# Patient Record
Sex: MALE | Race: WHITE | Employment: FULL TIME | URBAN - METROPOLITAN AREA
[De-identification: names, ages, dates, MRNs, and addresses within clinical notes are randomized per-mention and may not be internally consistent; named-entity substitution may affect disease eponyms.]

---

## 2019-10-09 ENCOUNTER — OFFICE VISIT (OUTPATIENT)
Dept: HEMATOLOGY | Age: 49
End: 2019-10-09

## 2019-10-09 ENCOUNTER — HOSPITAL ENCOUNTER (OUTPATIENT)
Dept: LAB | Age: 49
Discharge: HOME | End: 2019-10-09
Payer: COMMERCIAL

## 2019-10-09 VITALS
SYSTOLIC BLOOD PRESSURE: 152 MMHG | TEMPERATURE: 98.1 F | OXYGEN SATURATION: 96 % | BODY MASS INDEX: 32.93 KG/M2 | HEIGHT: 70 IN | WEIGHT: 230 LBS | HEART RATE: 70 BPM | DIASTOLIC BLOOD PRESSURE: 107 MMHG

## 2019-10-09 DIAGNOSIS — B18.2 CHRONIC HEPATITIS C WITHOUT HEPATIC COMA (HCC): Primary | ICD-10-CM

## 2019-10-09 DIAGNOSIS — B18.2 CHRONIC HEPATITIS C WITHOUT HEPATIC COMA (HCC): ICD-10-CM

## 2019-10-09 LAB
ALBUMIN SERPL-MCNC: 3.6 G/DL (ref 3.4–5)
ALBUMIN/GLOB SERPL: 0.8 {RATIO} (ref 0.8–1.7)
ALP SERPL-CCNC: 277 U/L (ref 45–117)
ALT SERPL-CCNC: 175 U/L (ref 16–61)
ANION GAP SERPL CALC-SCNC: 5 MMOL/L (ref 3–18)
AST SERPL-CCNC: 184 U/L (ref 10–38)
BASOPHILS # BLD: 0 K/UL (ref 0–0.1)
BASOPHILS NFR BLD: 0 % (ref 0–2)
BILIRUB DIRECT SERPL-MCNC: 0.3 MG/DL (ref 0–0.2)
BILIRUB SERPL-MCNC: 0.6 MG/DL (ref 0.2–1)
BUN SERPL-MCNC: 12 MG/DL (ref 7–18)
BUN/CREAT SERPL: 15 (ref 12–20)
CALCIUM SERPL-MCNC: 8.7 MG/DL (ref 8.5–10.1)
CHLORIDE SERPL-SCNC: 104 MMOL/L (ref 100–111)
CO2 SERPL-SCNC: 29 MMOL/L (ref 21–32)
CREAT SERPL-MCNC: 0.81 MG/DL (ref 0.6–1.3)
DIFFERENTIAL METHOD BLD: ABNORMAL
EOSINOPHIL # BLD: 0.1 K/UL (ref 0–0.4)
EOSINOPHIL NFR BLD: 2 % (ref 0–5)
ERYTHROCYTE [DISTWIDTH] IN BLOOD BY AUTOMATED COUNT: 12.6 % (ref 11.6–14.5)
FERRITIN SERPL-MCNC: 205 NG/ML (ref 8–388)
GLOBULIN SER CALC-MCNC: 4.3 G/DL (ref 2–4)
GLUCOSE SERPL-MCNC: 97 MG/DL (ref 74–99)
HCT VFR BLD AUTO: 41 % (ref 36–48)
HGB BLD-MCNC: 13.8 G/DL (ref 13–16)
IRON SATN MFR SERPL: 60 %
IRON SERPL-MCNC: 224 UG/DL (ref 50–175)
LYMPHOCYTES # BLD: 1.7 K/UL (ref 0.9–3.6)
LYMPHOCYTES NFR BLD: 31 % (ref 21–52)
MCH RBC QN AUTO: 34.8 PG (ref 24–34)
MCHC RBC AUTO-ENTMCNC: 33.7 G/DL (ref 31–37)
MCV RBC AUTO: 103.5 FL (ref 74–97)
MONOCYTES # BLD: 0.5 K/UL (ref 0.05–1.2)
MONOCYTES NFR BLD: 9 % (ref 3–10)
NEUTS SEG # BLD: 3.2 K/UL (ref 1.8–8)
NEUTS SEG NFR BLD: 58 % (ref 40–73)
PLATELET # BLD AUTO: 79 K/UL (ref 135–420)
PMV BLD AUTO: 10.9 FL (ref 9.2–11.8)
POTASSIUM SERPL-SCNC: 3.9 MMOL/L (ref 3.5–5.5)
PROT SERPL-MCNC: 7.9 G/DL (ref 6.4–8.2)
RBC # BLD AUTO: 3.96 M/UL (ref 4.7–5.5)
SODIUM SERPL-SCNC: 138 MMOL/L (ref 136–145)
TIBC SERPL-MCNC: 371 UG/DL (ref 250–450)
WBC # BLD AUTO: 5.5 K/UL (ref 4.6–13.2)

## 2019-10-09 PROCEDURE — 85025 COMPLETE CBC W/AUTO DIFF WBC: CPT

## 2019-10-09 PROCEDURE — 86708 HEPATITIS A ANTIBODY: CPT

## 2019-10-09 PROCEDURE — 82728 ASSAY OF FERRITIN: CPT

## 2019-10-09 PROCEDURE — 87340 HEPATITIS B SURFACE AG IA: CPT

## 2019-10-09 PROCEDURE — 87521 HEPATITIS C PROBE&RVRS TRNSC: CPT

## 2019-10-09 PROCEDURE — 80048 BASIC METABOLIC PNL TOTAL CA: CPT

## 2019-10-09 PROCEDURE — 80076 HEPATIC FUNCTION PANEL: CPT

## 2019-10-09 PROCEDURE — 83540 ASSAY OF IRON: CPT

## 2019-10-09 PROCEDURE — 86704 HEP B CORE ANTIBODY TOTAL: CPT

## 2019-10-09 PROCEDURE — 86706 HEP B SURFACE ANTIBODY: CPT

## 2019-10-09 PROCEDURE — 36415 COLL VENOUS BLD VENIPUNCTURE: CPT

## 2019-10-09 RX ORDER — BUPRENORPHINE HYDROCHLORIDE AND NALOXONE HYDROCHLORIDE DIHYDRATE 8; 2 MG/1; MG/1
TABLET SUBLINGUAL DAILY
COMMUNITY

## 2019-10-09 NOTE — PROGRESS NOTES
[facility], MD, Camilla Angelucci, Stafford Copas, MD Bula Lin, PAEFRAÍN Juarez, [facility]     Luz Marina Mckay, [facility]   [place] Ceasar, WILLIAM    Jennifer Bridgette, [facility]       Manjit Finney [address]    at [address], [facility] Stefania Corona  22.    [phone]    FAX: [address] [place] [address], [address]    [phone]    FAX: [phone]       Patient Care Team:  Ellyn Parr MD as PCP - General (Family Practice)      Problem List  Date Reviewed: 2/18/2016          Codes Class Noted    Chronic hepatitis C ([facility] [address] ICD-10-CM: B18.2  ICD-9-CM: 070.54  8/17/2015        Drug addiction in remission [facility]) ICD-10-CM: M78.68  ICD-9-CM: 304.93  8/17/2015    Overview Signed 8/17/2015 11:01 AM by Satya Coates MD     Taking subutex. Last drug use 2009. Depression ICD-10-CM: F32.9  ICD-9-CM: 375  8/17/2015              The clinicians listed above have asked me to see Vanessa Morales in consultation regarding chronic HCV and its management. All medical records sent by the referring physicians were reviewed including imaging studies     The patient is a 51 y.o.  male who was found to have abnormalities in liver chemistries and subsequently tested positive for chronic HCV in 6/2015. His last visit to this office was 2/2016. Risk factors for acquiring HCV are IV drug use in [id number]. There was no history of acute incteric hepatitis at the time of these risk factors. Imaging of the liver was either not performed or the results are not available to me. An assessment of liver fibrosis with biopsy or elastography has not been performed.       The patient has never received treatment for chronic HCV. The patient has no symptoms which can be attributed to the liver disorder. The patient has not experienced the following symptoms:  fatigue, pain in the right side over the liver, problems concentrating. The patient completes all daily activities without any functional limitations. ASSESSMENT AND PLAN:  Chronic HCV   Chronic HCV of unclear severity. Will perform laboratory testing to monitor liver function and degree of liver injury. This included BMP, hepatic panel, CBC with platelet count. Will perform and/or review results of HCV viral load, HCV genotype to define the specific treatment and duration of treatment that will be required. Will perform serologic and virologic studies to assess for other causes of chronic liver disease. Will perform imaging of the liver with ultrasound. The degree of fibrosis will be assessed by liver biopsy, Fibroscan or shear wave elastography. Chronic HCV Treatment  The patient has not been treated for HCV. The patient has HCV genotype 1a. Discussed the treatment alternatives. The SVR/cure rate for HCV now exceeds 97% without significant side effects for most patients with HCV. The specific treatment is dependent upon genotype, viral load and histology. The patient should be treated with Mavyret (glecaprevir and piprentasvir) or Epclusa (sofosbuvir and valpitasvir). The SVR/cure rate for is over 95%. Screening for Hepatocellular Carcinoma  HCC screening is not necessary if the patient has no evidence of cirrhosis. Treatment of other medical problems in patients with chronic liver disease  There are no contraindications for the patient to take most medications that are necessary for treatment of other medical issues.     The patient can take   Any medications utilized for treatment of DM  Statins to treat hypercholesterolemia    The patient consumes alcohol on a daily basis or has recently stopped consuming alcohol. Regular alcohol use increases the risk of toxicity from acetaminophen. This analgesic should be avoided until the patient has been abstinent from alcohol for 6 months. Counseling for alcohol in patients with chronic liver disease  The patient was counseled regarding alcohol consumption and the effect of alcohol on chronic liver disease. It was recommended that all alcohol consumption be stopped and the patient be abstinent from alcohol    Vaccinations   The need for vaccination against viral hepatitis A and B will be assessed with serologic and instituted as appropriate. Routine vaccinations against other bacterial and viral agents can be performed as indicated. Annual flu vaccination should be administered if indicated. ALLERGIES  Allergies   Allergen Reactions    Bees [Hymenoptera Allergenic Extract] Anaphylaxis       MEDICATIONS  Current Outpatient Medications   Medication Sig    buprenorphine-naloxone 8-2 mg subl by SubLINGual route daily. No current facility-administered medications for this visit. SYSTEM REVIEW NOT RELATED TO LIVER DISEASE OR REVIEWED ABOVE:  Constitution systems: Negative for fever, chills, weight gain, weight loss. Eyes: Negative for visual changes. ENT: Negative for sore throat, painful swallowing. Respiratory: Negative for cough, hemoptysis, SOB. Cardiology: Negative for chest pain, palpitations. GI:  Negative for constipation or diarrhea. : Negative for urinary frequency, dysuria, hematuria, nocturia. Skin: Negative for rash. Hematology: Negative for easy bruising, blood clots. Musculo-skelatal: Negative for back pain, muscle pain, weakness. Neurologic: Negative for headaches, dizziness, vertigo, memory problems not related to HE. Psychology: Negative for anxiety, depression. FAMILY HISTORY:  The father  of MI. The mother  of complications of flu. There is no family history of liver disease.       SOCIAL HISTORY:  The patient has never been . The patient has no children. The patient currently smokes 1 pack of tobacco daily. The patient consumes 12 alcoholic beverages per week. The patient currently works full time . PHYSICAL EXAMINATION:  Visit Vitals  BP (!) 140/100 (BP 1 Location: Left arm, BP Patient Position: Sitting)   Pulse 70   Temp 98.1 °F (36.7 °C)   SpO2 96%       General: No acute distress. Eyes: Sclera anicteric. ENT: No oral lesions. Thyroid normal.  Nodes: No adenopathy. Skin: No spider angiomata. No jaundice. No palmar erythema. Respiratory: Lungs clear to auscultation. Cardiovascular: Regular heart rate. No murmurs. No JVD. Abdomen: Soft non-tender. Liver size normal to percussion/palpation. Spleen not palpable. No obvious ascites. Extremities: No edema. No muscle wasting. No gross arthritic changes. Neurologic: Alert and oriented. Cranial nerves grossly intact. No asterixis. LABORATORY STUDIES:  Recent liver function panel, CBC with platelet count and BMP are not available. These studies will be performed. SEROLOGIES:  Not available or performed. Testing was performed today. LIVER HISTOLOGY:  02/2016. [facility]. EkPa was 11.6. Suggested fibrosis level is F3. ENDOSCOPIC PROCEDURES:  Not available or performed    RADIOLOGY:  Not available or performed    OTHER TESTING:  Not available or performed    FOLLOW-UP:  All of the issues listed above in the Assessment and Plan were discussed with the patient. All questions were answered. The patient expressed a clear understanding of the above. [facility] 87 in 4 weeks for elastography and to initiate HCV treatment.       JENNY Atkinson  [address] Liver [place] [address], [facility] [address]  [place], [address] [place] [address] Street - [address]  [phone]

## 2019-10-10 LAB
HAV AB SER QL IA: NEGATIVE
HBV CORE AB SERPL QL IA: NEGATIVE
HBV SURFACE AB SER QL IA: NEGATIVE
HBV SURFACE AB SERPL IA-ACNC: <3.1 MIU/ML
HBV SURFACE AG SER QL: <0.1 INDEX
HBV SURFACE AG SER QL: NEGATIVE
HEP BS AB COMMENT,HBSAC: ABNORMAL

## 2019-10-11 LAB
HCV RNA SERPL NAA+PROBE-LOG IU: 6.81 HCV LOG 10IU/ML
HCV RNA SERPL PROBE AMP-ACNC: ABNORMAL HCVIU/ML
HCV RNA SERPL QL NAA+PROBE: POSITIVE

## 2019-10-14 VITALS
HEART RATE: 70 BPM | DIASTOLIC BLOOD PRESSURE: 100 MMHG | OXYGEN SATURATION: 96 % | TEMPERATURE: 98.1 F | SYSTOLIC BLOOD PRESSURE: 140 MMHG

## 2019-11-05 NOTE — PROGRESS NOTES
[facility], Amy CROFT Karilyn Henderson, MD Jeffie Ribas, PAEFRAÍN Jane, [facility]     Luz Marina OLIVARES Nely, [facility]   Payammatthew Moulton, P-KISHAN Betancourt, [facility]       Manjit Finney [facility] 136    at [address], [address] Stefania Corona  22.    [phone]    FAX: [address] [place] [address], [address]    [phone]    FAX: [phone]         Patient Care Team:  Parker Ballesteros MD as PCP - General (Family Practice)      Problem List  Date Reviewed: 2/18/2016          Codes Class Noted    Chronic hepatitis C ([facility] ICD-10-CM: B18.2  ICD-9-CM: 070.54  8/17/2015        Drug addiction in remission [facility]) ICD-10-CM: K98.85  ICD-9-CM: 304.93  8/17/2015    Overview Signed 8/17/2015 11:01 AM by Andrea Lo MD     Taking subutex. Last drug use 2009. Depression ICD-10-CM: F32.9  ICD-9-CM: 291  8/17/2015              Mookie Castro returns to the [place] [address] for management of chronic HBV. The active problem list, all pertinent past medical history, medications, radiologic findings and laboratory findings related to the liver disorder were reviewed with the patient. The patient is a 51 y.o.  male who was noted to have abnormalities in liver chemistries and subsequently tested positive for chronic HCV in 6/2015. This is the first appointment at [place] [address] since 2/2016. A fibroscan performed in 2/2016 suggests stage 3, bridging fibrosis. The patient has never received treatment for chronic HCV.       The patient has the following symptoms which are thought to be due to the liver disease: fatigue,     The patient has not experienced the following symptoms: pain in the right side over the liver,     The patient completes all daily activities without any functional limitations. 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 Severity of the liver disease was assessed by elastography in 2016  Will repeat elastography prior to strting HCV treatment. Have performed laboratory testing to monitor liver function and degree of liver injury. This included BMP, hepatic panel, CBC with platelet count, INR. Liver transaminases are elevated. ALP is elevated. Liver function is normal.  The platelet count is   Depressed. Based upon laboratory studies Fibroscan, and imaging the patient may have cirrhosis. Will perform and/or review results of HCV viral load, HCV genotype to define the specific treatment and duration of treatment that will be required. Chronic HCV Treatment  The patient has not been treated for HCV. The patient has HCV genotype 1A. Discussed the treatment alternatives. The SVR/cure rate for HCV now exceeds 97% without significant side effects for most patients with HCV. The specific treatment is dependent upon genotype, viral load and histology. The patient should be treated with Harvoni (sofasbuvir and ledipasvir), Mavyret (glecaprevir and piprentasvir) or Epclusa (sofosbuvir and valpitasvir) The SVR/cure rate for is over 95%. Thrombocytopenia   This is secondary to cirrhosis. There is no evidence of overt bleeding. No treatment is required. The platelet count is adequate for the patient to undergo procedures without the need for platelet transfusion or platelet growth factors. Screening for Hepatocellular Carcinoma  [address] screening has not been not been performed. AFP was ordered today and ultrasound will be scheduled.     Treatment of other medical problems in patients with chronic liver disease  There are no contraindications for the patient to take most medications that are necessary for treatment of other medical issues. The patient has cirrhrosis and should avoid taking NSAIDs which are associated with a higher rate of developing TIFFANIE. The patient can take Any medications utilized for treatment of DM, Statins to treat hypercholesterolemia    The patient does not comsume alcohol on a daily basis. Normal doses of acetaminophen, as recommended on the label of the bottle, are not hepatotoxic except in the setting of daily alcohol use, even in patients with cirrhosis and can be utilized for pain. Counseling for alcohol in patients with chronic liver disease  The patient was counseled regarding alcohol consumption and the effect of alcohol on chronic liver disease. The patient does not consume any significant amount of alcohol. Vaccinations   Vaccination for viral hepatitis A and B is recommended since the patient has no serologic evidence of previous exposure or vaccination with immunity. Routine vaccinations against other bacterial and viral agents can be performed as indicated. Annual flu vaccination should be administered if indicated. ALLERGIES  Allergies   Allergen Reactions    Bees [Hymenoptera Allergenic Extract] Anaphylaxis       MEDICATIONS  Current Outpatient Medications   Medication Sig    buprenorphine-naloxone 8-2 mg subl by SubLINGual route daily. No current facility-administered medications for this visit. SYSTEM REVIEW NOT RELATED TO LIVER DISEASE OR REVIEWED ABOVE:  Constitution systems: Negative for fever, chills, weight gain, weight loss. Eyes: Negative for visual changes. ENT: Negative for sore throat, painful swallowing. Respiratory: Negative for cough, hemoptysis, SOB. Cardiology: Negative for chest pain, palpitations. GI:  Negative for constipation or diarrhea. : Negative for urinary frequency, dysuria, hematuria, nocturia. Skin: Negative for rash. Hematology: Negative for easy bruising, blood clots. Musculo-skelatal: Negative for back pain, muscle pain, weakness. Neurologic: Negative for headaches, dizziness, vertigo, memory problems not related to HE. Psychology: Negative for anxiety, depression. FAMILY HISTORY:  The father  of COPD. The mother  of CHF. There is no family history of liver disease. SOCIAL HISTORY:  The patient has never been . The patient has no children. The patient currently smokes 1 pack of tobacco daily. The patient consumes 4-5 alcoholic beverages per day. The patient currently works full time as an . PHYSICAL EXAMINATION:  Visit Vitals  BP (!) 152/107   Pulse 70   Temp 98.1 °F (36.7 °C) (Tympanic)   Ht 5' 10\" (1.778 m)   Wt 230 lb (104.3 kg)   SpO2 96%   BMI 33.00 kg/m²     General: No acute distress. Eyes: Sclera anicteric. ENT: No oral lesions. Thyroid normal.  Nodes: No adenopathy. Skin: No spider angiomata. No jaundice. No palmar erythema. Respiratory: Lungs clear to auscultation. Cardiovascular: Regular heart rate. No murmurs. No JVD. Abdomen: Soft non-tender. Liver size normal to percussion/palpation. Spleen not palpable. No obvious ascites. Extremities: No edema. No muscle wasting. No gross arthritic changes. Neurologic: Alert and oriented. Cranial nerves grossly intact. No asterixis.     LABORATORY STUDIES:  [facility] Ref Rng 2016   WBC 3.4 - 10.8 x10E3/uL  4.9   ANC 1.4 - 7.0 x10E3/uL  3.0   HGB 12.6 - 17.7 g/dL  13.9    - 379 x10E3/uL  89 (LL)   AST 0 - 40 IU/L 56 (H) 78 (H)   ALT 0 - 44 IU/L 54 (H) 81 (H)   Alk Phos 39 - 117 IU/L 100 110   Bili, Total 0.0 - 1.2 mg/dL 0.7 0.9   Bili, Direct 0.00 - 0.40 mg/dL  0.23   Albumin 3.5 - 5.5 g/dL 4.5 4.2   BUN 6 - 24 mg/dL 11 11   Creat 0.76 - 1.27 mg/dL 1.01 0.95   Na 134 - 144 mmol/L 141 138   K 3.5 - 5.2 mmol/L 4.7 4.6   Cl 97 - 108 mmol/L 100 98   CO2 18 - 29 mmol/L 28 27   Glucose 65 - 99 mg/dL 93 99 SEROLOGIES:  Serologies Latest Ref Rng 8/17/2015   Hep A Ab, Total Negative Negative   Hep B Surface Ag Negative Negative   Hep B Core Ab, Total Negative Negative   Hep B Surface AB QL  Non Reactive   Hep C Genotype  1a   HCV RT-PCR, Quant  See Final Results   Ferritin 30 - 400 ng/mL 255   Iron % Saturation 15 - 55 % 50     Serologies Latest Ref Rng 2/11/2016   Hep A Ab, Total Negative    Hep B Surface Ag Negative    Hep B Core Ab, Total Negative    Hep B Surface AB QL     Hep C Genotype     HCV RT-PCR, Quant  [id number]   Ferritin 30 - 400 ng/mL    Iron % Saturation 15 - 55 %      LIVER HISTOLOGY:  02/2016. FibroScan performed at [address]. EkPa was 11.6. Suggested fibrosis level is F3. ENDOSCOPIC PROCEDURES:  Not available or performed    RADIOLOGY:  Not available or performed    OTHER TESTING:  Not available or performed    FOLLOW-UP:  All of the issues listed above in the Assessment and Plan were discussed with the patient. All questions were answered. The patient expressed a clear understanding of the above. [facility] 87 in 4 weeks and to initiate HCV treatment.       Thaddeus Zamudio MD  [address]  4 [facility], [address] [place] [address]  98 [address], [address]  12 [facility]

## 2019-11-06 ENCOUNTER — OFFICE VISIT (OUTPATIENT)
Dept: HEMATOLOGY | Age: 49
End: 2019-11-06

## 2019-11-06 ENCOUNTER — HOSPITAL ENCOUNTER (OUTPATIENT)
Dept: ULTRASOUND IMAGING | Age: 49
Discharge: HOME | End: 2019-11-06
Attending: NURSE PRACTITIONER
Payer: COMMERCIAL

## 2019-11-06 VITALS
HEIGHT: 70 IN | WEIGHT: 232 LBS | HEART RATE: 82 BPM | TEMPERATURE: 98.2 F | BODY MASS INDEX: 33.21 KG/M2 | OXYGEN SATURATION: 97 % | DIASTOLIC BLOOD PRESSURE: 102 MMHG | SYSTOLIC BLOOD PRESSURE: 150 MMHG

## 2019-11-06 DIAGNOSIS — B18.2 CHRONIC HEPATITIS C WITHOUT HEPATIC COMA (HCC): ICD-10-CM

## 2019-11-06 DIAGNOSIS — B18.2 CHRONIC HEPATITIS C WITHOUT HEPATIC COMA (HCC): Primary | ICD-10-CM

## 2019-11-06 DIAGNOSIS — K74.60 CIRRHOSIS OF LIVER WITHOUT ASCITES, UNSPECIFIED HEPATIC CIRRHOSIS TYPE (HCC): ICD-10-CM

## 2019-11-06 PROCEDURE — 76981 USE PARENCHYMA: CPT

## 2019-11-06 RX ORDER — LISINOPRIL 10 MG/1
10 TABLET ORAL DAILY
Qty: 30 TAB | Refills: 3 | Status: SHIPPED | OUTPATIENT
Start: 2019-11-06

## 2019-11-06 RX ORDER — VELPATASVIR AND SOFOSBUVIR 100; 400 MG/1; MG/1
1 TABLET, FILM COATED ORAL DAILY
Qty: 28 TAB | Refills: 2 | Status: SHIPPED | OUTPATIENT
Start: 2019-11-06 | End: 2019-11-18 | Stop reason: ALTCHOICE

## 2019-11-06 NOTE — PROGRESS NOTES
[address] [facility], MD, Meenakshi Scott MD Joneen Cheers, MIKEL Gannon, [facility]     Luz Marina OLIVARES Nely, [facility]   JAMES Lockhart-KISHAN Hussein, [facility]       Manjit Finney [facility] [address]    at [address], [address] Stefania Corona  22.    [phone]    FAX: [address] [place] [address], [address]    [phone]    FAX: [phone]       Patient Care Team:  Ac Mercado MD as PCP - General (Family Practice)      Problem List  Date Reviewed: 11/8/2019          Codes Class Noted    Cirrhosis of liver without ascites [facility]) ICD-10-CM: K74.60  ICD-9-CM: 571.5  11/23/2019        Chronic hepatitis C ([facility] 75.) ICD-10-CM: B18.2  ICD-9-CM: 070.54  8/17/2015        Drug addiction in remission [facility]) ICD-10-CM: O81.75  ICD-9-CM: 304.93  8/17/2015    Overview Signed 8/17/2015 11:01 AM by Eliza Morales MD     Taking subutex. Last drug use 2009. Depression ICD-10-CM: F32.9  ICD-9-CM: 696  8/17/2015              Jeremy Robison returns to the [place] [address] for management of chronic HCV. The active problem list, all pertinent past medical history, medications, liver histology, radiologic findings, and laboratory findings related to the liver disorder were reviewed with the patient. The patient is a 51 y.o.  male who was found to have abnormalities in liver chemistries and subsequently tested positive for chronic HCV in 6/2015. His last visit to this office was 2/2016. Risk factors for acquiring HCV are IV drug use in 0366-4891. There was no history of acute incteric hepatitis at the time of these risk factors.         The most recent imaging of the liver was Ultrasound performed in 11/2019. Results suggest chronic liver disease. Assessment of liver fibrosis was performed with shear wave elastography at THE [facility] in 11/2019. The result was E mean 12.15 kPa which correlates with cirrhosis. The patient has never received treatment for chronic HCV. The patient has no symptoms which can be attributed to the liver disorder. The patient has not experienced the following symptoms:  fatigue, pain in the right side over the liver, problems concentrating. The patient completes all daily activities without any functional limitations. ASSESSMENT AND PLAN:  Chronic HCV   Chronic HCV with cirrhosis. Have performed laboratory testing to monitor liver function and degree of liver injury. This included BMP, hepatic panel, CBC with platelet count. Reviewed results of HCV viral load, HCV genotype to define the specific treatment and duration of treatment that will be required. Serologic and virologic studies to assess for other causes of chronic liver disease were negative. Chronic HCV Treatment  The patient has not been treated for HCV. The patient has HCV genotype 1a. Discussed the treatment alternatives. The SVR/cure rate for HCV now exceeds 97% without significant side effects for most patients with HCV. The specific treatment is dependent upon genotype, viral load and histology. The patient should be treated with Mavyret (glecaprevir and piprentasvir) or Epclusa (sofosbuvir and valpitasvir). The SVR/cure rate for is over 95%. Screening for Hepatocellular Carcinoma  HCC screening is not necessary if the patient has no evidence of cirrhosis. Treatment of other medical problems in patients with chronic liver disease  There are no contraindications for the patient to take most medications that are necessary for treatment of other medical issues.     The patient can take   Any medications utilized for treatment of DM  Statins to treat hypercholesterolemia  The patient consumes alcohol on a daily basis or has recently stopped consuming alcohol. Regular alcohol use increases the risk of toxicity from acetaminophen. This analgesic should be avoided until the patient has been abstinent from alcohol for 6 months. Counseling for alcohol in patients with chronic liver disease  The patient was counseled regarding alcohol consumption and the effect of alcohol on chronic liver disease. It was recommended that all alcohol consumption be stopped and the patient be abstinent from alcohol    Vaccinations   Vaccination for viral hepatitis A and B is not needed. The patient has serologic evidence of prior exposure or vaccination with immunity. Routine vaccinations against other bacterial and viral agents can be performed as indicated. Annual flu vaccination should be administered if indicated. ALLERGIES  Allergies   Allergen Reactions    Bees [Hymenoptera Allergenic Extract] Anaphylaxis       MEDICATIONS  Current Outpatient Medications   Medication Sig    lisinopril (PRINIVIL, ZESTRIL) 10 mg tablet Take 1 Tab by mouth daily.  buprenorphine-naloxone 8-2 mg subl by SubLINGual route daily.  glecaprevir-pibrentasvir (MAVYRET) 100-40 mg tab Take 3 Tabs by mouth daily. No current facility-administered medications for this visit. SYSTEM REVIEW NOT RELATED TO LIVER DISEASE OR REVIEWED ABOVE:  Constitution systems: Negative for fever, chills, weight gain, weight loss. Eyes: Negative for visual changes. ENT: Negative for sore throat, painful swallowing. Respiratory: Negative for cough, hemoptysis, SOB. Cardiology: Negative for chest pain, palpitations. GI:  Negative for constipation or diarrhea. : Negative for urinary frequency, dysuria, hematuria, nocturia. Skin: Negative for rash. Hematology: Negative for easy bruising, blood clots. Musculo-skelatal: Negative for back pain, muscle pain, weakness.   Neurologic: Negative for headaches, dizziness, vertigo, memory problems not related to HE. Psychology: Negative for anxiety, depression. FAMILY HISTORY:  The father  of MI. The mother  of complications of flu. There is no family history of liver disease. SOCIAL HISTORY:  The patient has never been . The patient has no children. The patient currently smokes 1 pack of tobacco daily. The patient consumes 12 alcoholic beverages per week. The patient currently works full time . PHYSICAL EXAMINATION:  Visit Vitals  BP (!) 150/102   Pulse 82   Temp 98.2 °F (36.8 °C)   Ht 5' 10\" (1.778 m)   Wt 232 lb (105.2 kg)   SpO2 97%   BMI 33.29 kg/m²       General: No acute distress. Eyes: Sclera anicteric. ENT: No oral lesions. Thyroid normal.  Nodes: No adenopathy. Skin: No spider angiomata. No jaundice. No palmar erythema. Respiratory: Lungs clear to auscultation. Cardiovascular: Regular heart rate. No murmurs. No JVD. Abdomen: Soft non-tender. Liver size normal to percussion/palpation. Spleen not palpable. No obvious ascites. Extremities: No edema. No muscle wasting. No gross arthritic changes. Neurologic: Alert and oriented. Cranial nerves grossly intact. No asterixis.     LABORATORY STUDIES:  Liver [place] of [address] Units 10/9/2019   WBC 4.6 - 13.2 K/uL 5.5   ANC 1.8 - 8.0 K/UL 3.2   HGB 13.0 - 16.0 g/dL 13.8    - 420 K/uL 79 (L)   AST 10 - 38 U/L 184 (H)   ALT 16 - 61 U/L 175 (H)   Alk Phos 45 - 117 U/L 277 (H)   Bili, Total 0.2 - 1.0 MG/DL 0.6   Bili, Direct 0.0 - 0.2 MG/DL 0.3 (H)   Albumin 3.4 - 5.0 g/dL 3.6   BUN 7.0 - 18 MG/DL 12   Creat 0.6 - 1.3 MG/DL 0.81   Na 136 - 145 mmol/L 138   K 3.5 - 5.5 mmol/L 3.9   Cl 100 - 111 mmol/L 104   CO2 21 - 32 mmol/L 29   Glucose 74 - 99 mg/dL 97       SEROLOGIES:  Serologies Latest Ref Rng & Units 10/9/2019   Hep A Ab, Total NEGATIVE   NEGATIVE   Hep B Surface Ag <1.00 Index <0.10   Hep B Surface Ag Interp NEG   NEGATIVE   Hep B Core Ab, Total NEGATIVE   NEGATIVE   Hep B Surface Ab >10.0 mIU/mL <3.10 (L)   Hep B Surface Ab Interp POS   NEGATIVE (A)   HCV RT-PCR, Quant IU/mL 5,878,290    Ferritin 8 - 388 NG/   Iron % Saturation % 60       LIVER HISTOLOGY:  02/2016. [facility]. EkPa was 11.6. Suggested fibrosis level is F3.  11/2019. Shear wave elastography performed at THE [facility]. EkPa was E Range: 9.52-13.48 kPa, E Mean: 12.15 kPa, E Median: 12.13 kPa, E Std: 1.64 kPa. The results suggested a fibrosis level of F4. ENDOSCOPIC PROCEDURES:  Not available or performed    RADIOLOGY:  11/2019. Ultrasound of liver. Echogenic consistent with chronic liver disease. No liver mass lesions. No dilated bile ducts. No ascites. OTHER TESTING:  Not available or performed    FOLLOW-UP:  All of the issues listed above in the Assessment and Plan were discussed with the patient. All questions were answered. The patient expressed a clear understanding of the above. [address] [place] [address] in 4 weeks for elastography and to initiate HCV treatment.       Brandie Martinez, AGPCNP-BC  [address] [facility] [place] [address], [address] [place] [address] - [address]  [phone]

## 2019-11-06 NOTE — PROGRESS NOTES
Vanessa Morales is a 51 y.o. male      1. Have you been to the ER, urgent care clinic or hospitalized since your last visit? NO.     2. Have you seen or consulted any other health care providers outside of the [address] [place] [address] since your last visit (Include any pap smears or colon screening)? NO          No flowsheet data found.

## 2019-11-23 PROBLEM — K74.60 CIRRHOSIS OF LIVER WITHOUT ASCITES (HCC): Status: ACTIVE | Noted: 2019-11-23

## 2020-02-03 ENCOUNTER — OFFICE VISIT (OUTPATIENT)
Dept: HEMATOLOGY | Age: 50
End: 2020-02-03

## 2020-02-03 ENCOUNTER — HOSPITAL ENCOUNTER (OUTPATIENT)
Dept: LAB | Age: 50
Discharge: HOME | End: 2020-02-03
Payer: COMMERCIAL

## 2020-02-03 VITALS
DIASTOLIC BLOOD PRESSURE: 79 MMHG | HEART RATE: 70 BPM | BODY MASS INDEX: 33.07 KG/M2 | HEIGHT: 70 IN | OXYGEN SATURATION: 97 % | SYSTOLIC BLOOD PRESSURE: 125 MMHG | WEIGHT: 231 LBS | TEMPERATURE: 97.3 F

## 2020-02-03 DIAGNOSIS — B18.2 CHRONIC HEPATITIS C WITHOUT HEPATIC COMA (HCC): Primary | ICD-10-CM

## 2020-02-03 DIAGNOSIS — B18.2 CHRONIC HEPATITIS C WITHOUT HEPATIC COMA (HCC): ICD-10-CM

## 2020-02-03 LAB
ALBUMIN SERPL-MCNC: 3.6 G/DL (ref 3.4–5)
ALBUMIN/GLOB SERPL: 0.9 {RATIO} (ref 0.8–1.7)
ALP SERPL-CCNC: 206 U/L (ref 45–117)
ALT SERPL-CCNC: 42 U/L (ref 16–61)
ANION GAP SERPL CALC-SCNC: 1 MMOL/L (ref 3–18)
AST SERPL-CCNC: 41 U/L (ref 10–38)
BASOPHILS # BLD: 0 K/UL (ref 0–0.1)
BASOPHILS NFR BLD: 0 % (ref 0–2)
BILIRUB DIRECT SERPL-MCNC: 0.2 MG/DL (ref 0–0.2)
BILIRUB SERPL-MCNC: 0.5 MG/DL (ref 0.2–1)
BUN SERPL-MCNC: 12 MG/DL (ref 7–18)
BUN/CREAT SERPL: 13 (ref 12–20)
CALCIUM SERPL-MCNC: 8.8 MG/DL (ref 8.5–10.1)
CHLORIDE SERPL-SCNC: 105 MMOL/L (ref 100–111)
CO2 SERPL-SCNC: 34 MMOL/L (ref 21–32)
CREAT SERPL-MCNC: 0.9 MG/DL (ref 0.6–1.3)
DIFFERENTIAL METHOD BLD: ABNORMAL
EOSINOPHIL # BLD: 0.3 K/UL (ref 0–0.4)
EOSINOPHIL NFR BLD: 5 % (ref 0–5)
ERYTHROCYTE [DISTWIDTH] IN BLOOD BY AUTOMATED COUNT: 11.9 % (ref 11.6–14.5)
GLOBULIN SER CALC-MCNC: 4 G/DL (ref 2–4)
GLUCOSE SERPL-MCNC: 78 MG/DL (ref 74–99)
HCT VFR BLD AUTO: 39.5 % (ref 36–48)
HGB BLD-MCNC: 13.3 G/DL (ref 13–16)
LYMPHOCYTES # BLD: 2.8 K/UL (ref 0.9–3.6)
LYMPHOCYTES NFR BLD: 43 % (ref 21–52)
MCH RBC QN AUTO: 34.5 PG (ref 24–34)
MCHC RBC AUTO-ENTMCNC: 33.7 G/DL (ref 31–37)
MCV RBC AUTO: 102.6 FL (ref 74–97)
MONOCYTES # BLD: 0.7 K/UL (ref 0.05–1.2)
MONOCYTES NFR BLD: 10 % (ref 3–10)
NEUTS SEG # BLD: 2.9 K/UL (ref 1.8–8)
NEUTS SEG NFR BLD: 42 % (ref 40–73)
PLATELET # BLD AUTO: 88 K/UL (ref 135–420)
PMV BLD AUTO: 11.7 FL (ref 9.2–11.8)
POTASSIUM SERPL-SCNC: 4.5 MMOL/L (ref 3.5–5.5)
PROT SERPL-MCNC: 7.6 G/DL (ref 6.4–8.2)
RBC # BLD AUTO: 3.85 M/UL (ref 4.7–5.5)
SODIUM SERPL-SCNC: 140 MMOL/L (ref 136–145)
WBC # BLD AUTO: 6.7 K/UL (ref 4.6–13.2)

## 2020-02-03 PROCEDURE — 87521 HEPATITIS C PROBE&RVRS TRNSC: CPT

## 2020-02-03 PROCEDURE — 36415 COLL VENOUS BLD VENIPUNCTURE: CPT

## 2020-02-03 PROCEDURE — 80048 BASIC METABOLIC PNL TOTAL CA: CPT

## 2020-02-03 PROCEDURE — 85025 COMPLETE CBC W/AUTO DIFF WBC: CPT

## 2020-02-03 PROCEDURE — 80076 HEPATIC FUNCTION PANEL: CPT

## 2020-02-03 NOTE — PROGRESS NOTES
[address] [facility], Dior CROFT Berneda Husbands, MIKEL Ramos, [facility]-BC     April S Nely, [facility]   WILLIAM Joy    Ham Israel, [facility]       Man[facility]it [facility] [address]    at [facility]    [address], [address] [facility], [place]  22.    [phone]    FAX: [address] [place] [address], [address], [address]    [phone]    FAX: [phone]       Patient Care Team:  Simon Branham MD as PCP - General (Family Practice)      Problem List  Date Reviewed: 11/8/2019          Codes Class Noted    Cirrhosis of liver without ascites [facility]) ICD-10-CM: K74.60  ICD-9-CM: 571.5  11/23/2019        Chronic hepatitis C ([facility] ICD-10-CM: B18.2  ICD-9-CM: 070.54  8/17/2015        Drug addiction in remission [facility]) ICD-10-CM: F11.59  ICD-9-CM: 304.93  8/17/2015    Overview Signed 8/17/2015 11:01 AM by Isaias Lai MD     Taking subutex. Last drug use 2009. Depression ICD-10-CM: F32.9  ICD-9-CM: 609  8/17/2015              Ciro Woodward returns to the [place] [address] for management of chronic HCV. The active problem list, all pertinent past medical history, medications, liver histology, radiologic findings, and laboratory findings related to the liver disorder were reviewed with the patient. The patient is a 51 y.o.  male who was found to have abnormalities in liver chemistries and subsequently tested positive for chronic HCV in 6/2015. His last visit to this office was 2/2016. The most recent imaging of the liver was Ultrasound performed in 11/2019. Results suggest chronic liver disease. Assessment of liver fibrosis was performed with shear wave elastography at [facility] in 11/2019. The result was E mean 12.15 kPa which correlates with cirrhosis. The patient has no symptoms which can be attributed to the liver disorder. The patient has not experienced the following symptoms:  fatigue, pain in the right side over the liver, problems concentrating. The patient completes all daily activities without any functional limitations. ASSESSMENT AND PLAN:  Chronic HCV   Chronic HCV with cirrhosis. Will perform laboratory testing to monitor liver function and degree of liver injury. This included BMP, hepatic panel, CBC with platelet count. Serologic and virologic studies to assess for other causes of chronic liver disease were negative. Chronic HCV Treatment  The patient has HCV genotype 1a. The patient is being treated with [address] (glecaprevir and piprentasvir). Treatment start date 12/30/2019. Screening for Hepatocellular Carcinoma  HCC screening has recently been performed and does not suggest [facility] [address] The next liver imaging study will be performed in 5/2020. AFP was ordered today and ultrasound will be scheduled. Treatment of other medical problems in patients with chronic liver disease  There are no contraindications for the patient to take most medications that are necessary for treatment of other medical issues. The patient can take   Any medications utilized for treatment of DM  Statins to treat hypercholesterolemia  The patient consumes alcohol on a daily basis or has recently stopped consuming alcohol. Regular alcohol use increases the risk of toxicity from acetaminophen. This analgesic should be avoided until the patient has been abstinent from alcohol for 6 months. Counseling for alcohol in patients with chronic liver disease  The patient was counseled regarding alcohol consumption and the effect of alcohol on chronic liver disease. The patient has been abstinent from alcohol since prior to starting HCV treatment in 12/2019.      Vaccinations Vaccination for viral hepatitis A and B is not needed. The patient has serologic evidence of prior exposure or vaccination with immunity. Routine vaccinations against other bacterial and viral agents can be performed as indicated. Annual flu vaccination should be administered if indicated. ALLERGIES  Allergies   Allergen Reactions    Bees [Hymenoptera Allergenic Extract] Anaphylaxis       MEDICATIONS  Current Outpatient Medications   Medication Sig    glecaprevir-pibrentasvir (MAVYRET) 100-40 mg tab Take 3 Tabs by mouth daily.  lisinopril (PRINIVIL, ZESTRIL) 10 mg tablet Take 1 Tab by mouth daily.  buprenorphine-naloxone 8-2 mg subl by SubLINGual route daily. No current facility-administered medications for this visit. SYSTEM REVIEW NOT RELATED TO LIVER DISEASE OR REVIEWED ABOVE:  Constitution systems: Negative for fever, chills, weight gain, weight loss. Eyes: Negative for visual changes. ENT: Negative for sore throat, painful swallowing. Respiratory: Negative for cough, hemoptysis, SOB. Cardiology: Negative for chest pain, palpitations. GI:  Negative for constipation or diarrhea. : Negative for urinary frequency, dysuria, hematuria, nocturia. Skin: Negative for rash. Hematology: Negative for easy bruising, blood clots. Musculo-skelatal: Negative for back pain, muscle pain, weakness. Neurologic: Negative for headaches, dizziness, vertigo, memory problems not related to HE. Psychology: Negative for anxiety, depression. FAMILY HISTORY:  The father  of MI. The mother  of complications of flu. There is no family history of liver disease. SOCIAL HISTORY:  The patient has never been . The patient has no children. The patient currently smokes 1 pack of tobacco daily. The patient has previously consumed alcohol in excess. The patient currently works full time .         PHYSICAL EXAMINATION:  Visit Vitals  /79 (BP 1 Location: Right arm, BP Patient Position: Sitting)   Pulse 70   Temp 97.3 °F (36.3 °C)   Ht 5' 10\" (1.778 m)   Wt 231 lb (104.8 kg)   SpO2 97%   BMI 33.15 kg/m²       General: No acute distress. Eyes: Sclera anicteric. ENT: No oral lesions. Thyroid normal.  Nodes: No adenopathy. Skin: No spider angiomata. No jaundice. No palmar erythema. Respiratory: Lungs clear to auscultation. Cardiovascular: Regular heart rate. No murmurs. No JVD. Abdomen: Soft non-tender. Liver size normal to percussion/palpation. Spleen not palpable. No obvious ascites. Extremities: No edema. No muscle wasting. No gross arthritic changes. Neurologic: Alert and oriented. Cranial nerves grossly intact. No asterixis. LABORATORY STUDIES:  Liver [place] [address] Units 10/9/2019   WBC 4.6 - 13.2 K/uL 5.5   ANC 1.8 - 8.0 K/UL 3.2   HGB 13.0 - 16.0 g/dL 13.8    - 420 K/uL 79 (L)   AST 10 - 38 U/L 184 (H)   ALT 16 - 61 U/L 175 (H)   Alk Phos 45 - 117 U/L 277 (H)   Bili, Total 0.2 - 1.0 MG/DL 0.6   Bili, Direct 0.0 - 0.2 MG/DL 0.3 (H)   Albumin 3.4 - 5.0 g/dL 3.6   BUN 7.0 - 18 MG/DL 12   Creat 0.6 - 1.3 MG/DL 0.81   Na 136 - 145 mmol/L 138   K 3.5 - 5.5 mmol/L 3.9   Cl 100 - 111 mmol/L 104   CO2 21 - 32 mmol/L 29   Glucose 74 - 99 mg/dL 97       SEROLOGIES:  Serologies Latest Ref Rng & Units 10/9/2019   Hep A Ab, Total NEGATIVE   NEGATIVE   Hep B Surface Ag <1.00 Index <0.10   Hep B Surface Ag Interp NEG   NEGATIVE   Hep B Core Ab, Total NEGATIVE   NEGATIVE   Hep B Surface Ab >10.0 mIU/mL <3.10 (L)   Hep B Surface Ab Interp POS   NEGATIVE (A)   HCV RT-PCR, Quant IU/mL 9,382,851    Ferritin 8 - 388 NG/   Iron % Saturation % 60       LIVER HISTOLOGY:  02/2016. FibroScan performed at [address]. EkPa was 11.6. Suggested fibrosis level is F3.  11/2019. Shear wave elastography performed at [facility].  EkPa was E Range: 9.52-13.48 kPa, E Mean: 12.15 kPa, E Median: 12.13 kPa, E Std: 1.64 kPa. The results suggested a fibrosis level of F4. ENDOSCOPIC PROCEDURES:  Not available or performed    RADIOLOGY:  11/2019. Ultrasound of liver. Echogenic consistent with chronic liver disease. No liver mass lesions. No dilated bile ducts. No ascites. OTHER TESTING:  Not available or performed    FOLLOW-UP:  All of the issues listed above in the Assessment and Plan were discussed with the patient. All questions were answered. The patient expressed a clear understanding of the above. Return to [place] [address] for monitoring of HCV treatment in 4 months to assess for SVR/cure. The patient will have US/elastography one week prior to next visit.          JAZMÍN Randolph-BC  [address] Liver [place] [address], [address] [place] [address] - [address] [phone]

## 2020-02-07 LAB
HCV RNA SERPL NAA+PROBE-LOG IU: <1 HCV LOG 10IU/ML
HCV RNA SERPL PROBE AMP-ACNC: <10 HCVIU/ML
HCV RNA SERPL QL NAA+PROBE: POSITIVE

## 2020-06-02 ENCOUNTER — VIRTUAL VISIT (OUTPATIENT)
Dept: HEMATOLOGY | Age: 50
End: 2020-06-02

## 2020-06-02 DIAGNOSIS — B18.2 CHRONIC HEPATITIS C WITHOUT HEPATIC COMA (HCC): ICD-10-CM

## 2020-06-02 DIAGNOSIS — K74.60 CIRRHOSIS OF LIVER WITHOUT ASCITES, UNSPECIFIED HEPATIC CIRRHOSIS TYPE (HCC): Primary | ICD-10-CM

## 2020-06-02 NOTE — PROGRESS NOTES
[address] [facility], [place] CROFT Jeralene Guan, MD Mason Gathers, PA-C Almetta Gaudier, [facility]-BC     Luz Marina Mckay, [facility]-BC   Reshma Mclaughlin FNP-C    Janet Almanzar, [facility]       Manjit Finney [facility] [address]    at [facility]    [address] [place] [address] Ave, [address] Stefania Corona  22.    [phone]    FAX: [address] [place] [address] [address], [address]    [phone]    FAX: [phone]       Patient Care Team:  Amanda Alexander MD as PCP - General (Family Practice)      Problem List  Date Reviewed: 11/8/2019          Codes Class Noted    Cirrhosis of liver without ascites [facility]) ICD-10-CM: K74.60  ICD-9-CM: 571.5  11/23/2019        Chronic hepatitis C ([facility] ICD-10-CM: B18.2  ICD-9-CM: 070.54  8/17/2015        Drug addiction in remission [facility]) ICD-10-CM: X50.09  ICD-9-CM: 304.93  8/17/2015    Overview Signed 8/17/2015 11:01 AM by Megan Jules MD     Taking subutex. Last drug use 2009. Depression ICD-10-CM: F32.9  ICD-9-CM: 763  8/17/2015            VIRTUAL TELEHEALTH VISIT PERFORMED DUE TO COVID-19 EPIDEMIC    CONSENT:  Andrew Thorne, who was seen by synchronous, real-time, audio-video technology, and/or his healthcare decision maker, is aware that this patient-initiated, Telehealth encounter on 6/2/2020 is a billable service, with coverage as determined by his insurance carrier. He is aware that he may receive a bill and has provided verbal consent to proceed. This patient was evaluated during a Virtual Telehealth visit. A caregiver was present if appropriate.  Due to this being a TeleHealth encounter performed during the [place] [address] public health emergency, the physical examination was limited to that listed in the [address] [facility] returns to the [place] [address] for management of cirrhosis secondary to chronic HCV. The active problem list, all pertinent past medical history, medications, liver histology, radiologic findings, and laboratory findings related to the liver disorder were reviewed with the patient. The patient is a 51 y.o.  male who was found to have abnormalities in liver chemistries and subsequently tested positive for chronic HCV in 6/2015. His last visit to this office was 2/2016. The most recent imaging of the liver was Ultrasound performed in 11/2019. Results suggest chronic liver disease. Assessment of liver fibrosis was performed with shear wave elastography at [facility] in 11/2019. The result was E mean 12.15 kPa which correlates with cirrhosis. The patient has no symptoms which can be attributed to the liver disorder. The patient has not experienced the following symptoms: fatigue, pain in the right side over the liver, problems concentrating. The patient completes all daily activities without any functional limitations. ASSESSMENT AND PLAN:  Chronic HCV   Chronic HCV with cirrhosis. Will perform laboratory testing to monitor liver function and degree of liver injury. This included BMP, hepatic panel, CBC with platelet count. Serologic and virologic studies to assess for other causes of chronic liver disease were negative. Chronic HCV Treatment  The patient has HCV genotype 1a. The patient completed treatment with Josefa Bamberger (glecaprevir and piprentasvir). Treatment dates 12/30/2019- 2/24/2020. Screening for Esophageal varices   The patient has not had an EGD to screen for varices. Will schedule for EGD to assess for varices and need for banding. Clinical Trials: The patient is not participating in any clinical trials. Discussed participation in the [id number] study. The patient is interested and his name was given to nurses in research. Screening for Hepatocellular Carcinoma  HCC screening has recently been performed and does not suggest [facility] [address] The next liver imaging study was due in 5/2020. AFP was ordered today and ultrasound will be scheduled. Treatment of other medical problems in patients with chronic liver disease  There are no contraindications for the patient to take most medications that are necessary for treatment of other medical issues. The patient can take any medications utilized for treatment of DM. Statins to treat hypercholesterolemia  The patient consumes alcohol on a daily basis or has recently stopped consuming alcohol. Regular alcohol use increases the risk of toxicity from acetaminophen. This analgesic should be avoided until the patient has been abstinent from alcohol for 6 months. Counseling for alcohol in patients with chronic liver disease  The patient was counseled regarding alcohol consumption and the effect of alcohol on chronic liver disease. The patient has been abstinent from alcohol since prior to starting HCV treatment in 12/2019. Vaccinations   Vaccination for viral hepatitis A and B is not needed. The patient has serologic evidence of prior exposure or vaccination with immunity. Routine vaccinations against other bacterial and viral agents can be performed as indicated. Annual flu vaccination should be administered if indicated. ALLERGIES  Allergies   Allergen Reactions    Bees [Hymenoptera Allergenic Extract] Anaphylaxis       MEDICATIONS  Current Outpatient Medications   Medication Sig    lisinopril (PRINIVIL, ZESTRIL) 10 mg tablet Take 1 Tab by mouth daily.  buprenorphine-naloxone 8-2 mg subl by SubLINGual route daily. No current facility-administered medications for this visit. SYSTEM REVIEW NOT RELATED TO LIVER DISEASE OR REVIEWED ABOVE:  Constitution systems: Negative for fever, chills, weight gain, weight loss. Eyes: Negative for visual changes.   ENT: Negative for sore throat, painful swallowing. Respiratory: Negative for cough, hemoptysis, SOB. Cardiology: Negative for chest pain, palpitations. GI:  Negative for constipation or diarrhea. : Negative for urinary frequency, dysuria, hematuria, nocturia. Skin: Negative for rash. Hematology: Negative for easy bruising, blood clots. Musculo-skelatal: Negative for back pain, muscle pain, weakness. Neurologic: Negative for headaches, dizziness, vertigo, memory problems not related to HE. Psychology: Negative for anxiety, depression. FAMILY HISTORY:  The father  of MI. The mother  of complications of flu. There is no family history of liver disease. SOCIAL HISTORY:  The patient has never been . The patient has no children. The patient currently smokes 1 pack of tobacco daily. The patient has previously consumed alcohol in excess. The patient currently works full time . PHYSICAL EXAMINATION:  There were no vitals taken for this visit. General: No acute distress. Eyes: Sclera anicteric. ENT: No oral lesions. Nodes: No adenopathy. Skin: No spider angiomata. No jaundice. No palmar erythema. Respiratory: No wheezing, respiratory distress, cyanosis. Cardiovascular: No JVD. Abdomen: Appears soft with no obvious ascites. Extremities: No edema. No muscle wasting. No gross arthritic changes. Neurologic: Alert and oriented. Cranial nerves grossly intact. No asterixis.       LABORATORY STUDIES:  Liver [place] of [address] [place] [address] 10/9/2019   WBC 4.6 - 13.2 K/uL 5.5   ANC 1.8 - 8.0 K/UL 3.2   HGB 13.0 - 16.0 g/dL 13.8    - 420 K/uL 79 (L)   AST 10 - 38 U/L 184 (H)   ALT 16 - 61 U/L 175 (H)   Alk Phos 45 - 117 U/L 277 (H)   Bili, Total 0.2 - 1.0 MG/DL 0.6   Bili, Direct 0.0 - 0.2 MG/DL 0.3 (H)   Albumin 3.4 - 5.0 g/dL 3.6   BUN 7.0 - 18 MG/DL 12   Creat 0.6 - 1.3 MG/DL 0.81   Na 136 - 145 mmol/L 138   K 3.5 - 5.5 mmol/L 3.9   Cl 100 - 111 mmol/L 104   CO2 21 - 32 mmol/L 29   Glucose 74 - 99 mg/dL 97       SEROLOGIES:  Serologies Latest Ref Rng & Units 10/9/2019   Hep A Ab, Total NEGATIVE   NEGATIVE   Hep B Surface Ag <1.00 Index <0.10   Hep B Surface Ag Interp NEG   NEGATIVE   Hep B Core Ab, Total NEGATIVE   NEGATIVE   Hep B Surface Ab >10.0 mIU/mL <3.10 (L)   Hep B Surface Ab Interp POS   NEGATIVE (A)   HCV RT-PCR, Quant IU/mL 5,569,283    Ferritin 8 - 388 NG/   Iron % Saturation % 60       LIVER HISTOLOGY:  02/2016. [facility]. EkPa was 11.6. Suggested fibrosis level is F3.  11/2019. Shear wave elastography performed at [facility]. EkPa was E Range: 9.52-13.48 kPa, E Mean: 12.15 kPa, E Median: 12.13 kPa, E Std: 1.64 kPa. The results suggested a fibrosis level of F4. ENDOSCOPIC PROCEDURES:  Not available or performed    RADIOLOGY:  11/2019. Ultrasound of liver. Echogenic consistent with chronic liver disease. No liver mass lesions. No dilated bile ducts. No ascites. OTHER TESTING:  Not available or performed    FOLLOW-UP:  All of the issues listed above in the Assessment and Plan were discussed with the patient. All questions were answered. The patient expressed a clear understanding of the above. Return to [place] [address] for monitoring of cirrhosis in 6 months. The patient will have US and labs drawn within the next few weeks.        Yisel Christina, [facility]. Miles Snider [facility] [place] [address]  [address] [place] [address] [address]  [address] [place] [address], [address]  [phone]

## 2020-06-22 ENCOUNTER — HOSPITAL ENCOUNTER (OUTPATIENT)
Dept: LAB | Age: 50
Discharge: HOME | End: 2020-06-22
Payer: COMMERCIAL

## 2020-06-22 ENCOUNTER — HOSPITAL ENCOUNTER (OUTPATIENT)
Dept: ULTRASOUND IMAGING | Age: 50
Discharge: HOME | End: 2020-06-22
Attending: NURSE PRACTITIONER
Payer: COMMERCIAL

## 2020-06-22 DIAGNOSIS — K74.60 CIRRHOSIS OF LIVER WITHOUT ASCITES, UNSPECIFIED HEPATIC CIRRHOSIS TYPE (HCC): ICD-10-CM

## 2020-06-22 DIAGNOSIS — B18.2 CHRONIC HEPATITIS C WITHOUT HEPATIC COMA (HCC): ICD-10-CM

## 2020-06-22 LAB
ALBUMIN SERPL-MCNC: 3.7 G/DL (ref 3.4–5)
ALBUMIN/GLOB SERPL: 0.9 {RATIO} (ref 0.8–1.7)
ALP SERPL-CCNC: 192 U/L (ref 45–117)
ALT SERPL-CCNC: 41 U/L (ref 16–61)
ANION GAP SERPL CALC-SCNC: 5 MMOL/L (ref 3–18)
AST SERPL-CCNC: 37 U/L (ref 10–38)
BASOPHILS # BLD: 0 K/UL (ref 0–0.1)
BASOPHILS NFR BLD: 0 % (ref 0–2)
BILIRUB DIRECT SERPL-MCNC: 0.1 MG/DL (ref 0–0.2)
BILIRUB SERPL-MCNC: 0.5 MG/DL (ref 0.2–1)
BUN SERPL-MCNC: 13 MG/DL (ref 7–18)
BUN/CREAT SERPL: 15 (ref 12–20)
CALCIUM SERPL-MCNC: 9.1 MG/DL (ref 8.5–10.1)
CHLORIDE SERPL-SCNC: 101 MMOL/L (ref 100–111)
CO2 SERPL-SCNC: 29 MMOL/L (ref 21–32)
CREAT SERPL-MCNC: 0.88 MG/DL (ref 0.6–1.3)
DIFFERENTIAL METHOD BLD: ABNORMAL
EOSINOPHIL # BLD: 0.1 K/UL (ref 0–0.4)
EOSINOPHIL NFR BLD: 1 % (ref 0–5)
ERYTHROCYTE [DISTWIDTH] IN BLOOD BY AUTOMATED COUNT: 12.4 % (ref 11.6–14.5)
GLOBULIN SER CALC-MCNC: 4.2 G/DL (ref 2–4)
GLUCOSE SERPL-MCNC: 94 MG/DL (ref 74–99)
HCT VFR BLD AUTO: 42.7 % (ref 36–48)
HGB BLD-MCNC: 14.5 G/DL (ref 13–16)
LYMPHOCYTES # BLD: 2.1 K/UL (ref 0.9–3.6)
LYMPHOCYTES NFR BLD: 32 % (ref 21–52)
MCH RBC QN AUTO: 33.9 PG (ref 24–34)
MCHC RBC AUTO-ENTMCNC: 34 G/DL (ref 31–37)
MCV RBC AUTO: 99.8 FL (ref 74–97)
MONOCYTES # BLD: 0.5 K/UL (ref 0.05–1.2)
MONOCYTES NFR BLD: 8 % (ref 3–10)
NEUTS SEG # BLD: 3.9 K/UL (ref 1.8–8)
NEUTS SEG NFR BLD: 59 % (ref 40–73)
PLATELET # BLD AUTO: 93 K/UL (ref 135–420)
PMV BLD AUTO: 11.5 FL (ref 9.2–11.8)
POTASSIUM SERPL-SCNC: 4.2 MMOL/L (ref 3.5–5.5)
PROT SERPL-MCNC: 7.9 G/DL (ref 6.4–8.2)
RBC # BLD AUTO: 4.28 M/UL (ref 4.7–5.5)
SODIUM SERPL-SCNC: 135 MMOL/L (ref 136–145)
WBC # BLD AUTO: 6.5 K/UL (ref 4.6–13.2)

## 2020-06-22 PROCEDURE — 80048 BASIC METABOLIC PNL TOTAL CA: CPT

## 2020-06-22 PROCEDURE — 76981 USE PARENCHYMA: CPT

## 2020-06-22 PROCEDURE — 80076 HEPATIC FUNCTION PANEL: CPT

## 2020-06-22 PROCEDURE — 82107 ALPHA-FETOPROTEIN L3: CPT

## 2020-06-22 PROCEDURE — 85025 COMPLETE CBC W/AUTO DIFF WBC: CPT

## 2020-06-22 PROCEDURE — 36415 COLL VENOUS BLD VENIPUNCTURE: CPT

## 2020-06-22 PROCEDURE — 87521 HEPATITIS C PROBE&RVRS TRNSC: CPT

## 2020-06-23 LAB
AFP L3 MFR SERPL: NORMAL % (ref 0–9.9)
AFP SERPL-MCNC: 6.7 NG/ML (ref 0–8)

## 2020-06-26 LAB
HCV RNA SERPL NAA+PROBE-LOG IU: NOT DETECTED HCV LOG 10IU/ML
HCV RNA SERPL PROBE AMP-ACNC: NOT DETECTED HCVIU/ML
HCV RNA SERPL QL NAA+PROBE: NOT DETECTED